# Patient Record
(demographics unavailable — no encounter records)

---

## 2025-04-10 NOTE — ASSESSMENT
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 He has made a remarkable recovery, and continues to improve. I personally reviewed and interpreted his latest CT Head without contrast from 4/3/25 which demonstrates embolization material in the region of the occluded dural AV fistula.  The right parietal hematoma is resolved with some residual but resolving edema. There are no new areas of hemorrhage.   I recommended surveillance frameless protocol MRI brain with and without contrast in 5 months, and follow up 6 vessel diagnostic cerebral angiography to be performed to assess efficacy of treatment in 5 months. Risks, benefits, alternatives reviewed and patient wishes to proceed. We will therefore schedule Mr. Brothers for diagnostic cerebral angiogram under conscious sedation in 5 months.   I have asked the patient and his wife to contact me for any symptomatic development or progression in the interim at which time we can obtain expedited follow up imaging. I have once again counseled the patient to consult with his primary care physician and will forward clinical noted to their office. I have also referred the patient to Dr. Anthony Heredia for Lawrence visual field examination and neuro-ophthalmologic consultation.   A total of 45 minutes was spent relative to this encounter.

## 2025-04-10 NOTE — REASON FOR VISIT
[FreeTextEntry1] : EMIL BROTHERS is a 48 year male seen today, with his wife in attendance, in neurosurgery follow up and for imaging review.  He is status post endovascular embolization of a ruptured dural AV fistula via left middle meningeal artery x 2 on 3/5/25.  He had presented to [facility] ER on 3/2/25 with sudden onset dizziness, lightheadedness and feeling confused with associated visual changes.  He had recently been started on blood pressure medication (unknown, likely HCTZ) by his PCP.  Evaluation in the ER noted a left homonymous hemianopia. A CT head was remarkable for clustered Hematomas within the right Parieto-occipital lobe, CTA head and neck demonstrate multiple abnormal appearing vessels in the right parietal and occipital lobes concerning for possible vascular abnormalities. He was transferred to [place] where he underwent a diagnostic cerebral angiogram on 3/3/25 which showed a superior sagittal sinus dural AV fistula with predominant arterial supply from bilateral middle meningeal artery and right occipital artery branches.  He underwent endovascular transarterial Rolando embolization via 2 distinct distal middle meningeal artery branches on 3/5/25 with angiographically successful complete obliteration.  Admission was notable for acute kidney injury and he was followed by medicine and treated with IV fluid bolus and increased oral intake.  He was discharged on 3/7/2025.  He has not seen his PCP Dr. Kenna Bland since discharge despite instructions to do so.  He reports that his vision loss on the left has improved though blurry vision persists.  The patient denies new neurological symptoms.  He has discontinued his Keppra and has noted increasing energy levels and interactivity over the last 1-2 weeks. Imaging reviewed and detailed below.

## 2025-04-10 NOTE — DATA REVIEWED
[de-identified] : Exam: CT HEAD-NON STROKE 4/3/2025 Order#: [id number]    CLINICAL INFORMATION: INTRAPARENCHYMAL HEMORRHAGE OF BRAIN follow-up  COMPARISON: 3/7/2025  CONTRAST: IV Contrast: NONE .  TECHNIQUE: Serial axial images were obtained from the skull base to the vertex using multi-slice helical technique. Sagittal and coronal reformats were obtained.  FINDINGS:  VENTRICLES AND SULCI: Normal in size and configuration. INTRA-AXIAL: Evidence of residual edema at the site of prior acute hemorrhage in the right parietal territory. No new hemorrhage identified. Resolution of the previously identified acute hemorrhage.. EXTRA-AXIAL: Evidence of Alex embolization material appreciated at the level of the cranial vertex in a patient with known superior sagittal sinus dural AV fistula.  VISUALIZED SINUSES: Clear. TYMPANOMASTOID CAVITIES: Clear. VISUALIZED ORBITS: Normal. CALVARIUM: Intact.  MISCELLANEOUS: None.    IMPRESSION: Evidence of embolization material at the level of the cranial vertex in a patient with known dural AV fistula. Resolution of patient's right parietal hematoma now with residual edema identified in the right parietal region but the acute hemorrhage has resolved. No new areas of hemorrhage.  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 No residual fistula, early venous drainage, or cortical venous reflux noted post embolization.

## 2025-04-10 NOTE — PHYSICAL EXAM
[FreeTextEntry5] : mild left inferior homonymous quadrantanopia - reports improved since discharge.  Able to see fingers, but reports it's blurred.

## 2025-04-10 NOTE — DATA REVIEWED
[de-identified] : Exam: CT HEAD-NON STROKE 4/3/2025 Order#: [id number]    CLINICAL INFORMATION: INTRAPARENCHYMAL HEMORRHAGE OF BRAIN follow-up  COMPARISON: 3/7/2025  CONTRAST: IV Contrast: NONE .  TECHNIQUE: Serial axial images were obtained from the skull base to the vertex using multi-slice helical technique. Sagittal and coronal reformats were obtained.  FINDINGS:  VENTRICLES AND SULCI: Normal in size and configuration. INTRA-AXIAL: Evidence of residual edema at the site of prior acute hemorrhage in the right parietal territory. No new hemorrhage identified. Resolution of the previously identified acute hemorrhage.. EXTRA-AXIAL: Evidence of Maryneal embolization material appreciated at the level of the cranial vertex in a patient with known superior sagittal sinus dural AV fistula.  VISUALIZED SINUSES: Clear. TYMPANOMASTOID CAVITIES: Clear. VISUALIZED ORBITS: Normal. CALVARIUM: Intact.  MISCELLANEOUS: None.    IMPRESSION: Evidence of embolization material at the level of the cranial vertex in a patient with known dural AV fistula. Resolution of patient's right parietal hematoma now with residual edema identified in the right parietal region but the acute hemorrhage has resolved. No new areas of hemorrhage.  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 No residual fistula, early venous drainage, or cortical venous reflux noted post embolization.

## 2025-04-10 NOTE — REASON FOR VISIT
[FreeTextEntry1] : EMIL BROTHERS is a 48 year male seen today, with his wife in attendance, in neurosurgery follow up and for imaging review.  He is status post endovascular embolization of a ruptured dural AV fistula via left middle meningeal artery x 2 on 3/5/25.  He had presented to [facility] ER on 3/2/25 with sudden onset dizziness, lightheadedness and feeling confused with associated visual changes.  He had recently been started on blood pressure medication (unknown, likely HCTZ) by his PCP.  Evaluation in the ER noted a left homonymous hemianopia. A CT head was remarkable for clustered Hematomas within the right Parieto-occipital lobe, CTA head and neck demonstrate multiple abnormal appearing vessels in the right parietal and occipital lobes concerning for possible vascular abnormalities. He was transferred to [place] where he underwent a diagnostic cerebral angiogram on 3/3/25 which showed a superior sagittal sinus dural AV fistula with predominant arterial supply from bilateral middle meningeal artery and right occipital artery branches.  He underwent endovascular transarterial Rolando embolization via 2 distinct distal middle meningeal artery branches on 3/5/25 with angiographically successful complete obliteration.  Admission was notable for acute kidney injury and he was followed by medicine and treated with IV fluid bolus and increased oral intake.  He was discharged on 3/7/2025.  He has not seen his PCP Dr. Kenna Bland since discharge despite instructions to do so.  He reports that his vision loss on the left has improved though blurry vision persists.  The patient denies new neurological symptoms.  He has discontinued his Keppra and has noted increasing energy levels and interactivity over the last 1-2 weeks. Imaging reviewed and detailed below.

## 2025-04-10 NOTE — ASSESSMENT
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 He has made a remarkable recovery, and continues to improve. I personally reviewed and interpreted his latest CT Head without contrast from 4/3/25 which demonstrates embolization material in the region of the occluded dural AV fistula.  The right parietal hematoma is resolved with some residual but resolving edema. There are no new areas of hemorrhage.   I recommended surveillance frameless protocol MRI brain with and without contrast in 5 months, and follow up 6 vessel diagnostic cerebral angiography to be performed to assess efficacy of treatment in 5 months. Risks, benefits, alternatives reviewed and patient wishes to proceed. We will therefore schedule Mr. Brothers for diagnostic cerebral angiogram under conscious sedation in 5 months.   I have asked the patient and his wife to contact me for any symptomatic development or progression in the interim at which time we can obtain expedited follow up imaging. I have once again counseled the patient to consult with his primary care physician and will forward clinical noted to their office. I have also referred the patient to Dr. Anthony Heredia for Lawrence visual field examination and neuro-ophthalmologic consultation.   A total of 45 minutes was spent relative to this encounter.